# Patient Record
Sex: FEMALE | NOT HISPANIC OR LATINO | URBAN - METROPOLITAN AREA
[De-identification: names, ages, dates, MRNs, and addresses within clinical notes are randomized per-mention and may not be internally consistent; named-entity substitution may affect disease eponyms.]

---

## 2017-02-17 ENCOUNTER — CHARTING TRANS (OUTPATIENT)
Dept: PODIATRY | Age: 22
End: 2017-02-17

## 2017-02-17 ENCOUNTER — IMAGING SERVICES (OUTPATIENT)
Dept: OTHER | Age: 22
End: 2017-02-17

## 2017-02-27 ENCOUNTER — CHARTING TRANS (OUTPATIENT)
Dept: PODIATRY | Age: 22
End: 2017-02-27

## 2017-02-28 ENCOUNTER — CHARTING TRANS (OUTPATIENT)
Dept: OTHER | Age: 22
End: 2017-02-28

## 2017-03-02 ENCOUNTER — CHARTING TRANS (OUTPATIENT)
Dept: OTHER | Age: 22
End: 2017-03-02

## 2017-03-09 ENCOUNTER — CHARTING TRANS (OUTPATIENT)
Dept: OTHER | Age: 22
End: 2017-03-09

## 2017-08-04 ENCOUNTER — OFFICE VISIT (OUTPATIENT)
Dept: FAMILY MEDICINE CLINIC | Facility: CLINIC | Age: 22
End: 2017-08-04

## 2017-08-04 VITALS
SYSTOLIC BLOOD PRESSURE: 100 MMHG | TEMPERATURE: 98 F | DIASTOLIC BLOOD PRESSURE: 60 MMHG | RESPIRATION RATE: 16 BRPM | OXYGEN SATURATION: 99 % | HEART RATE: 73 BPM

## 2017-08-04 DIAGNOSIS — J40 BRONCHITIS: Primary | ICD-10-CM

## 2017-08-04 PROCEDURE — 99213 OFFICE O/P EST LOW 20 MIN: CPT | Performed by: PHYSICIAN ASSISTANT

## 2017-08-04 RX ORDER — FLUTICASONE PROPIONATE 50 MCG
2 SPRAY, SUSPENSION (ML) NASAL DAILY
Qty: 1 INHALER | Refills: 0 | Status: SHIPPED | OUTPATIENT
Start: 2017-08-04

## 2017-08-04 RX ORDER — BENZONATATE 200 MG/1
200 CAPSULE ORAL 3 TIMES DAILY PRN
Qty: 30 CAPSULE | Refills: 0 | Status: SHIPPED | OUTPATIENT
Start: 2017-08-04 | End: 2017-08-22

## 2017-08-04 RX ORDER — AZITHROMYCIN 250 MG/1
TABLET, FILM COATED ORAL
Qty: 1 PACKAGE | Refills: 0 | Status: SHIPPED | OUTPATIENT
Start: 2017-08-04 | End: 2017-08-22

## 2017-08-04 NOTE — PROGRESS NOTES
CHIEF COMPLAINT:   Patient presents with:  URI        HPI:   Sandra Washington is a 20year old female who presents for cough, sinus symptoms, sore throat  for  1  weeks.       Associated factors:    Fever No    WheezingYes    Dyspnea/SOBNo   Productive cough presents with:     ASSESSMENT:  Bronchitis  (primary encounter diagnosis)    PLAN:  Meds as below. Comfort Care as listed in Patient Instructions.       Meds & Refills for this Visit:  Signed Prescriptions Disp Refills    azithromycin (Blas Koenig) 250

## 2017-08-04 NOTE — PATIENT INSTRUCTIONS
What is Acute Bronchitis? Acute or short-term bronchitis last for days or weeks. It occurs when the bronchial tubes (airways in the lungs) are irritated by a virus, bacteria, or allergen. This causes a cough that produces yellow or greenish mucus.   In · Don’t smoke, and avoid secondhand smoke. · Use a humidifier, or breathe in steam from a hot shower. This may help loosen mucus. · Drink a lot of water and juice. They can soothe the throat and may help thin mucus.   · Sit up or use extra pillows when in Acute or short-term bronchitis last for days or weeks. It occurs when the bronchial tubes (airways in the lungs) are irritated by a virus, bacteria, or allergen. This causes a cough that produces yellow or greenish mucus.   Inside healthy lungs  Air travels · Use a humidifier, or breathe in steam from a hot shower. This may help loosen mucus. · Drink a lot of water and juice. They can soothe the throat and may help thin mucus. · Sit up or use extra pillows when in bed to help lessen coughing and congestion.

## 2017-08-12 ENCOUNTER — OFFICE VISIT (OUTPATIENT)
Dept: FAMILY MEDICINE CLINIC | Facility: CLINIC | Age: 22
End: 2017-08-12

## 2017-08-12 VITALS
SYSTOLIC BLOOD PRESSURE: 108 MMHG | OXYGEN SATURATION: 98 % | BODY MASS INDEX: 26.68 KG/M2 | WEIGHT: 145 LBS | TEMPERATURE: 98 F | HEIGHT: 62 IN | HEART RATE: 77 BPM | DIASTOLIC BLOOD PRESSURE: 70 MMHG | RESPIRATION RATE: 16 BRPM

## 2017-08-12 DIAGNOSIS — J01.00 ACUTE NON-RECURRENT MAXILLARY SINUSITIS: Primary | ICD-10-CM

## 2017-08-12 DIAGNOSIS — H66.001 ACUTE SUPPURATIVE OTITIS MEDIA OF RIGHT EAR WITHOUT SPONTANEOUS RUPTURE OF TYMPANIC MEMBRANE, RECURRENCE NOT SPECIFIED: ICD-10-CM

## 2017-08-12 PROCEDURE — 99213 OFFICE O/P EST LOW 20 MIN: CPT | Performed by: NURSE PRACTITIONER

## 2017-08-12 RX ORDER — PREDNISONE 20 MG/1
40 TABLET ORAL DAILY
Qty: 10 TABLET | Refills: 0 | Status: SHIPPED | OUTPATIENT
Start: 2017-08-12 | End: 2017-08-17

## 2017-08-12 RX ORDER — CEFDINIR 300 MG/1
300 CAPSULE ORAL 2 TIMES DAILY
Qty: 20 CAPSULE | Refills: 0 | Status: SHIPPED | OUTPATIENT
Start: 2017-08-12 | End: 2017-08-22

## 2017-08-12 NOTE — PROGRESS NOTES
CHIEF COMPLAINT:   Patient presents with:  Cough: was seen on 8/4 and treated with zpack not improving      HPI:   Jodie Boyer is a 20year old female who presents for cold symptoms for  2  weeks.  Symptoms have progressed into sinus congestion and been /70 (BP Location: Left arm, Patient Position: Sitting, Cuff Size: adult)   Pulse 77   Temp 98 °F (36.7 °C) (Oral)   Resp 16   Ht 62\"   Wt 145 lb   LMP 07/26/2017   SpO2 98%   BMI 26.52 kg/m²   GENERAL: well developed, well nourished,in no apparent d Sig: Take 2 tablets (40 mg total) by mouth daily. Risks, benefits, side effects of medication addressed and explained.     Patient Instructions     Sinusitis (Antibiotic Treatment)    The sinuses are air-filled spaces within the bones of the fa · Over-the-counter antihistamines may help if allergies contributed to your sinusitis.    · Do not use nasal rinses or irrigation during an acute sinus infection, unless told to by your health care provider.  Rinsing may spread the infection to other sinuse

## 2017-08-22 ENCOUNTER — HOSPITAL ENCOUNTER (OUTPATIENT)
Age: 22
Discharge: HOME | End: 2017-08-22
Attending: FAMILY MEDICINE
Payer: COMMERCIAL

## 2017-08-22 ENCOUNTER — APPOINTMENT (OUTPATIENT)
Dept: GENERAL RADIOLOGY | Age: 22
End: 2017-08-22
Attending: FAMILY MEDICINE
Payer: COMMERCIAL

## 2017-08-22 VITALS
OXYGEN SATURATION: 100 % | DIASTOLIC BLOOD PRESSURE: 67 MMHG | SYSTOLIC BLOOD PRESSURE: 117 MMHG | RESPIRATION RATE: 16 BRPM | HEART RATE: 88 BPM | TEMPERATURE: 99 F

## 2017-08-22 DIAGNOSIS — S63.612A SPRAIN OF RIGHT MIDDLE FINGER, UNSPECIFIED SITE OF FINGER, INITIAL ENCOUNTER: Primary | ICD-10-CM

## 2017-08-22 PROCEDURE — 99203 OFFICE O/P NEW LOW 30 MIN: CPT

## 2017-08-22 PROCEDURE — 99213 OFFICE O/P EST LOW 20 MIN: CPT

## 2017-08-22 PROCEDURE — 73140 X-RAY EXAM OF FINGER(S): CPT | Performed by: FAMILY MEDICINE

## 2017-08-22 RX ORDER — IBUPROFEN 400 MG/1
400 TABLET ORAL EVERY 6 HOURS PRN
COMMUNITY

## 2017-08-23 NOTE — ED INITIAL ASSESSMENT (HPI)
Pt sts smashed right 3rd finger between furniture and wall yesterday. Pain, swelling today. Finger appears deformed. Pt is right handed.

## 2017-08-23 NOTE — ED PROVIDER NOTES
Patient presents with:  Upper Extremity Injury (musculoskeletal)    HPI:     Gm Bowen is a 20year old female who presents today with a chief complaint of  R 3rd finger pain. Cause - injury.   Patient was moving furniture yesterday and apparently her injury  Other systems are as noted in HPI  All other systems reviewed and negative except as noted above      EXAM:   /67   Pulse 88   Temp 98.5 °F (36.9 °C) (Temporal)   Resp 16   LMP 07/26/2017   SpO2 100%   GENERAL: well developed, well nourished, week  For re-check    All results reviewed and discussed with patient. See AVS for detailed discharge instructions for your condition today.     Xr Finger(s) (min 2 Views), Right 3rd (cpt=73140)    Result Date: 8/22/2017  PROCEDURE:  XR FINGER(S) (MIN 2 VI

## 2017-10-30 ENCOUNTER — OFFICE VISIT (OUTPATIENT)
Dept: FAMILY MEDICINE CLINIC | Facility: CLINIC | Age: 22
End: 2017-10-30

## 2017-10-30 VITALS
DIASTOLIC BLOOD PRESSURE: 78 MMHG | SYSTOLIC BLOOD PRESSURE: 110 MMHG | OXYGEN SATURATION: 98 % | HEART RATE: 88 BPM | TEMPERATURE: 98 F

## 2017-10-30 DIAGNOSIS — R09.81 NASAL CONGESTION: ICD-10-CM

## 2017-10-30 DIAGNOSIS — J02.9 SORE THROAT: ICD-10-CM

## 2017-10-30 DIAGNOSIS — R05.9 COUGH: Primary | ICD-10-CM

## 2017-10-30 PROCEDURE — 99203 OFFICE O/P NEW LOW 30 MIN: CPT | Performed by: FAMILY MEDICINE

## 2017-10-30 RX ORDER — AZITHROMYCIN 250 MG/1
TABLET, FILM COATED ORAL
Qty: 6 TABLET | Refills: 0 | Status: SHIPPED | OUTPATIENT
Start: 2017-10-30

## 2017-10-30 NOTE — PATIENT INSTRUCTIONS
Please complete your antibiotic course    Call or come back if symptoms worsen or do not get better.

## 2017-10-30 NOTE — PROGRESS NOTES
HPI:    Patient ID: Jodie Boyer is a 20year old female. Patient presents with:  Cough  Sore Throat  Nasal Congestion      HPI  Patient is here for Cough, runny nose and sore throat for 2 weeks. Cough is productive of greenish sputum. No fever.  Bouchra Tompkins appears well-developed. HENT:   Head: Normocephalic. Right Ear: Tympanic membrane, external ear and ear canal normal. Tympanic membrane is not erythematous, not retracted and not bulging.    Left Ear: Tympanic membrane, external ear and ear canal normal

## 2018-11-29 VITALS — HEIGHT: 62 IN | BODY MASS INDEX: 29.44 KG/M2 | WEIGHT: 160 LBS | HEART RATE: 80 BPM

## 2018-11-29 VITALS — SYSTOLIC BLOOD PRESSURE: 108 MMHG | DIASTOLIC BLOOD PRESSURE: 72 MMHG | HEART RATE: 64 BPM | WEIGHT: 163 LBS

## 2022-05-13 ENCOUNTER — OFFICE VISIT (OUTPATIENT)
Dept: FAMILY MEDICINE CLINIC | Facility: CLINIC | Age: 27
End: 2022-05-13
Payer: COMMERCIAL

## 2022-05-13 VITALS
RESPIRATION RATE: 16 BRPM | TEMPERATURE: 98 F | SYSTOLIC BLOOD PRESSURE: 108 MMHG | WEIGHT: 187 LBS | OXYGEN SATURATION: 98 % | HEIGHT: 62 IN | DIASTOLIC BLOOD PRESSURE: 70 MMHG | BODY MASS INDEX: 34.41 KG/M2 | HEART RATE: 100 BPM

## 2022-05-13 DIAGNOSIS — J02.9 SORE THROAT: Primary | ICD-10-CM

## 2022-05-13 DIAGNOSIS — J06.9 VIRAL URI WITH COUGH: ICD-10-CM

## 2022-05-13 RX ORDER — ALBUTEROL SULFATE 90 UG/1
1-2 AEROSOL, METERED RESPIRATORY (INHALATION) EVERY 6 HOURS PRN
Qty: 1 EACH | Refills: 0 | Status: SHIPPED | OUTPATIENT
Start: 2022-05-13

## 2022-05-13 RX ORDER — BENZONATATE 200 MG/1
200 CAPSULE ORAL 3 TIMES DAILY PRN
Qty: 30 CAPSULE | Refills: 0 | Status: SHIPPED | OUTPATIENT
Start: 2022-05-13 | End: 2022-05-23

## 2022-05-16 LAB — CONTROL LINE PRESENT WITH A CLEAR BACKGROUND (YES/NO): YES YES/NO
